# Patient Record
Sex: FEMALE | URBAN - METROPOLITAN AREA
[De-identification: names, ages, dates, MRNs, and addresses within clinical notes are randomized per-mention and may not be internally consistent; named-entity substitution may affect disease eponyms.]

---

## 2022-02-08 ENCOUNTER — APPOINTMENT (RX ONLY)
Dept: URBAN - METROPOLITAN AREA CLINIC 23 | Facility: CLINIC | Age: 67
Setting detail: DERMATOLOGY
End: 2022-02-08

## 2022-02-08 DIAGNOSIS — Z41.9 ENCOUNTER FOR PROCEDURE FOR PURPOSES OTHER THAN REMEDYING HEALTH STATE, UNSPECIFIED: ICD-10-CM

## 2022-02-08 PROCEDURE — ? RECOMMENDATIONS

## 2022-02-08 PROCEDURE — ? IN-HOUSE DISPENSING PHARMACY

## 2022-02-08 PROCEDURE — ? BOTOX

## 2022-02-08 NOTE — PROCEDURE: BOTOX
Depressor Anguli Oris Units: 0
Expiration Date (Month Year): 2024-04
Additional Area 2 Units: 24
Show Nasal Units: Yes
Show Mentalis Units: No
Lot #: [id number]
Additional Area 2 Location: neckbands
Detail Level: Zone
Additional Area 3 Location: high forehead 2 cm above brows
Dilution (U/0.1 Cc): 2.5
Additional Area 5 Location: high forehead 1.5cm above brows
Consent: Written consent obtained. Risks include but not limited to lid/brow ptosis, bruising, swelling, diplopia, temporary effect, incomplete chemical denervation.
Additional Area 1 Location: right brow
Price (Use Numbers Only, No Special Characters Or $): 0.00
Additional Area 1 Units: 4
Glabellar Complex Units: [facility]
Additional Area 6 Location: [facility].

## 2022-02-08 NOTE — PROCEDURE: IN-HOUSE DISPENSING PHARMACY
Product 61 Refills: 0
Product 51 Unit Type: mg
Name Of Product 7: Latisse 3ml
Name Of Product 1: Hydroquinone 4% / Tretinoin 0.05% / Fluocinolone 0.01%
Product 5 Application Directions: Take one tablet today after procedure with full glass of water as indicated
Product 7 Units Dispensed: 1
Product 11 Application Directions: Apply a thin layer to the acne prone areas in the AM
Product 3 Amount/Unit (Numbers Only): [address] [facility], [place]
Product 5 Unit Type: tablets
Product 7 Application Directions: Apply to lashes at bedtime daily as indicated
Product 11 Unit Type: bottle(s)
Product 1 Application Directions: Apply thin layer to right cheek every night  at bedtime only.
Name Of Product 6: Prednisone 20mg
Name Of Product 12: Retinol Lite
Product 7 Unit Type: ml
Product 1 Unit Type: tube(s)
Product 14 Application Directions: Apply to the under eyes in the Am and pm
Product 2 Price/Unit (In Dollars): 0.00
Detail Level: Zone
Product 10 Application Directions: Take 1 tablet 2 times daily for 3 days
Name Of Product 19: Diazepam 5mg
Send Charges To Patient Encounter: No
Product 4 Application Directions: Take one tablet half hour today prior to procedure as indicated.  Dispense in office
Product 2 Amount/Unit (Numbers Only): 6
Name Of Product 5: loratadine 10 mg
Name Of Product 11: Clearing gel
Name Of Product 9: Clearing tone pads
Product 13 Application Directions: Apply to the entire face in the Am and pm
Name Of Product 3: Tretinoin 0.05% cream
Product 3 Application Directions: Apply a pea size amount to the entire face at night
Name Of Product 22: Brightening Pads
Name Of Product 14: Skin Better Interfuse eye treatment cream
Product 22 Application Directions: Apply to the affected area of the face
Product 9 Application Directions: Apply to the acne prone areas in the Am and Pm
Product 3 Unit Type: grams
Product 7 Amount/Unit (Numbers Only): 5
Name Of Product 10: Valtrex 500 mg
Product 9 Unit Type: jar(s)
Name Of Product 4: Valium 5 mg
Name Of Product 2: Valtrex
Name Of Product 8: TNS Essential
Product 6 Application Directions: Take one tablet for swelling today at the office as indicated and one tablet tomorrow
Product 12 Application Directions: Apply a pea size amount to the entire face at bedtime.
Name Of Product 21: Valium 5mg
Product 8 Application Directions: Apply to the clean face in the AM and PM daily
Product 2 Application Directions: apply to affected areas left lower leg am and pm  as indicated
Name Of Product 13: Skin Better Even tone correcting serum
Product 21 Application Directions: Take one tablet 30 minutes prior to procedure

## 2022-02-08 NOTE — PROCEDURE: RECOMMENDATIONS
Recommendation Preamble: The following recommendations were made during the visit:
Recommendations (Free Text): [place] full face spots
Detail Level: Detailed
Render Risk Assessment In Note?: no

## 2022-02-28 ENCOUNTER — APPOINTMENT (RX ONLY)
Dept: URBAN - METROPOLITAN AREA CLINIC 23 | Facility: CLINIC | Age: 67
Setting detail: DERMATOLOGY
End: 2022-02-28

## 2022-02-28 DIAGNOSIS — Z41.9 ENCOUNTER FOR PROCEDURE FOR PURPOSES OTHER THAN REMEDYING HEALTH STATE, UNSPECIFIED: ICD-10-CM

## 2022-02-28 PROCEDURE — ? RECOMMENDATIONS

## 2022-02-28 PROCEDURE — ? BOTOX

## 2022-02-28 NOTE — PROCEDURE: RECOMMENDATIONS
Detail Level: Detailed
Render Risk Assessment In Note?: no
Recommendation Preamble: The following recommendations were made during the visit:
Recommendations (Free Text): Ulthera or Profound checks and jawline

## 2022-02-28 NOTE — PROCEDURE: BOTOX
Right Periorbital Units: 0
Show Nasal Units: Yes
Lot #: [id number]
Show Lcl Units: No
Dilution (U/0.1 Cc): 2.5
Additional Area 5 Location: high forehead 1.5cm above brows
Expiration Date (Month Year): 2024-01
Additional Area 1 Location: neckbands
Additional Area 3 Location: high forehead 2 cm above brows
Price (Use Numbers Only, No Special Characters Or $): 0.00
Additional Area 6 Location: [facility].
Consent: Written consent obtained. Risks include but not limited to lid/brow ptosis, bruising, swelling, diplopia, temporary effect, incomplete chemical denervation.
Additional Area 1 Units: 10
Detail Level: Zone
Additional Area 2 Location: chin

## 2023-02-10 ENCOUNTER — APPOINTMENT (RX ONLY)
Dept: URBAN - METROPOLITAN AREA CLINIC 23 | Facility: CLINIC | Age: 68
Setting detail: DERMATOLOGY
End: 2023-02-10

## 2023-02-10 DIAGNOSIS — Z41.9 ENCOUNTER FOR PROCEDURE FOR PURPOSES OTHER THAN REMEDYING HEALTH STATE, UNSPECIFIED: ICD-10-CM

## 2023-02-10 PROCEDURE — ? RECOMMENDATIONS

## 2023-02-10 PROCEDURE — ? BOTOX

## 2023-02-10 NOTE — PROCEDURE: BOTOX
Show Lateral Platysmal Band Units: Yes
Right Pupillary Line Units: 0
Expiration Date (Month Year): 2024-03
Additional Area 5 Location: bunnies lines
Show Ucl Units: No
Additional Area 2 Location: neck bands
Consent: Written consent obtained. Risks include but not limited to lid/brow ptosis, bruising, swelling, diplopia, temporary effect, incomplete chemical denervation.
Additional Area 2 Units: 24
Periorbital Skin Units: [address]
Post-Care Instructions: Patient instructed to not lie down for 4 hours and limit physical activity for 24 hours. Patient instructed not to travel by airplane for 48 hours.
Lot #: [id number]
Additional Area 4 Location: [facility]
Additional Area 1 Location: lat brow
Dilution (U/0.1 Cc): 2.5
Incrementing Botox Units: By 0.5 Units
Additional Area 4 Units: 4
Detail Level: Detailed
Forehead Units: 8
Additional Area 6 Location: axilla
Glabellar Complex Units: 16
Additional Area 3 Location: lateral brows
Show Inventory Tab: Show

## 2023-02-10 NOTE — PROCEDURE: RECOMMENDATIONS
Render Risk Assessment In Note?: no
Detail Level: Zone
Recommendation Preamble: The following recommendations were made at today?s visit :
Recommendations (Free Text): Emepelle AM and PM, plus SPF daily  \\nSculptra to temples & cheeks: start 2 vials, janis will need minimum 4 vials.

## 2024-03-11 ENCOUNTER — APPOINTMENT (RX ONLY)
Dept: URBAN - METROPOLITAN AREA CLINIC 23 | Facility: CLINIC | Age: 69
Setting detail: DERMATOLOGY
End: 2024-03-11

## 2024-03-11 DIAGNOSIS — Z41.9 ENCOUNTER FOR PROCEDURE FOR PURPOSES OTHER THAN REMEDYING HEALTH STATE, UNSPECIFIED: ICD-10-CM

## 2024-03-11 DIAGNOSIS — L64.8 OTHER ANDROGENIC ALOPECIA: ICD-10-CM

## 2024-03-11 PROCEDURE — ? COUNSELING

## 2024-03-11 PROCEDURE — ? BOTOX

## 2024-03-11 PROCEDURE — 99213 OFFICE O/P EST LOW 20 MIN: CPT | Mod: NC

## 2024-03-11 PROCEDURE — ? RECOMMENDATIONS

## 2024-03-11 PROCEDURE — ? PRESCRIPTION

## 2024-03-11 PROCEDURE — ? FILLERS

## 2024-03-11 RX ORDER — MINOXIDIL 2.5 MG/1
TABLET ORAL
Qty: 30 | Refills: 2 | Status: ACTIVE

## 2024-03-11 ASSESSMENT — LOCATION SIMPLE DESCRIPTION DERM: LOCATION SIMPLE: RIGHT FOREHEAD

## 2024-03-11 ASSESSMENT — LOCATION ZONE DERM: LOCATION ZONE: FACE

## 2024-03-11 ASSESSMENT — LOCATION DETAILED DESCRIPTION DERM: LOCATION DETAILED: RIGHT SUPERIOR MEDIAL FOREHEAD

## 2024-03-11 NOTE — PROCEDURE: FILLERS
Additional Area 4 Volume In Cc: 0
Detail Level: Detailed
Additional Area 1 Volume In Cc: 1
Include Cannula Information In Note?: No
Show Inventory Tab: Show
Post-Care Instructions: Patient instructed to apply ice to reduce swelling.
Filler: Raoul Hirsch
Consent: Written consent obtained. Risks include but not limited to bruising, beading, irregular texture, ulceration, infection, allergic reaction, scar formation, incomplete augmentation, temporary nature, procedural pain.
Additional Area 1 Location: lateral mouth, perioral fine lines, marionette lines.
Map Statment: See Attach Map for Complete Details
Anesthesia Type: 1% lidocaine with epinephrine
Anesthesia Volume In Cc: 0.5
Additional Anesthesia Volume In Cc: 6
Additional Area 1 Location: lat [facility]
Inventory Information: This plan will send filler information to inventory based on the fillers you select. Multiple fillers can be sent but you must ensure you select the appropriate fillers in the inventory tab.

## 2024-03-11 NOTE — PROCEDURE: RECOMMENDATIONS
Recommendation Preamble: The following recommendations were made during the visit:
Render Risk Assessment In Note?: no
Recommendations (Free Text): Sculptra cheeks and temples
Detail Level: Detailed

## 2024-03-11 NOTE — PROCEDURE: RECOMMENDATIONS
Render Risk Assessment In Note?: no
Recommendations (Free Text): Minoxidil,PRP or hair transplant
Detail Level: Detailed
Recommendation Preamble: The following recommendations were made during the visit:

## 2024-03-11 NOTE — PROCEDURE: BOTOX
[facility] Units: 0
Show Right And Left Pupillary Line Units: No
Show Inventory Tab: Show
Show Forehead Units: Yes
Glabellar Complex Units: 20
Expiration Date (Month Year): 2024-03
Additional Area 2 Location: chin
Additional Area 5 Location: high forehead 1.5cm above brows
Periorbital Skin Units: 12
Price (Use Numbers Only, No Special Characters Or $): 0.00
Lot #: [id number]
Additional Area 1 Units: 4
Consent: Written consent obtained. Risks include but not limited to lid/brow ptosis, bruising, swelling, diplopia, temporary effect, incomplete chemical denervation.
Additional Area 1 Location: lat  brow
Incrementing Botox Units: By 0.5 Units
Dilution (U/0.1 Cc): 2.5
Additional Area 3 Location: high forehead 2 cm above brows
Detail Level: Zone
Additional Area 6 Location: [facility].

## 2025-02-18 ENCOUNTER — APPOINTMENT (OUTPATIENT)
Dept: URBAN - METROPOLITAN AREA CLINIC 23 | Facility: CLINIC | Age: 70
Setting detail: DERMATOLOGY
End: 2025-02-18

## 2025-02-18 DIAGNOSIS — Z41.9 ENCOUNTER FOR PROCEDURE FOR PURPOSES OTHER THAN REMEDYING HEALTH STATE, UNSPECIFIED: ICD-10-CM

## 2025-02-18 PROCEDURE — ? DYSPORT

## 2025-02-18 PROCEDURE — ? RECOMMENDATIONS

## 2025-02-18 NOTE — PROCEDURE: DYSPORT
Show [place] Units: Yes
Nasal Root Units: 0
Additional Area 3 Units: 10
Show Ucl Units: No
Lot #: [id number]
Consent: Written consent obtained. Risks include but not limited to lid/brow ptosis, bruising, swelling, diplopia, temporary effect, incomplete chemical denervation.
Dilution (U/0.1 Cc): 1.5
Forehead Units: 40
Additional Area 4 Location: 2cm high forehead
Detail Level: Simple
Additional Area 1 Location: neck bands
Expiration Date (Month Year): 2022-08
Glabellar Complex Units: 50
Additional Area 2 Location: crows feet
Price (Use Numbers Only, No Special Characters Or $): 0.00
Additional Area 5 Location: glabella
Additional Area 6 Location: Neck
Show Inventory Tab: Show
Periorbital Skin Units: 20
Additional Area 6 Units: 60
Additional Area 3 Location: Bunny lines

## 2025-02-18 NOTE — PROCEDURE: RECOMMENDATIONS
Recommendation Preamble: The following recommendations were made during the visit:
Recommendations (Free Text): Robert lower face, patient was quoted $2000 per treatment
Detail Level: Detailed
Render Risk Assessment In Note?: no